# Patient Record
Sex: MALE | Race: WHITE
[De-identification: names, ages, dates, MRNs, and addresses within clinical notes are randomized per-mention and may not be internally consistent; named-entity substitution may affect disease eponyms.]

---

## 2024-04-08 ENCOUNTER — TRANSCRIBE ORDERS (OUTPATIENT)
Dept: OTHER | Age: 24
End: 2024-04-08

## 2024-04-08 DIAGNOSIS — R07.9 EXERTIONAL CHEST PAIN: ICD-10-CM

## 2024-04-08 DIAGNOSIS — R00.2 PALPITATIONS: Primary | ICD-10-CM

## 2024-04-08 DIAGNOSIS — Z86.79 HISTORY OF CARDIAC ARRHYTHMIA: ICD-10-CM

## 2024-10-21 ENCOUNTER — TRANSCRIBE ORDERS (OUTPATIENT)
Dept: OTHER | Age: 24
End: 2024-10-21

## 2024-10-21 DIAGNOSIS — F90.2 ATTENTION-DEFICIT/HYPERACTIVITY DISORDER, COMBINED PRESENTATION: Primary | ICD-10-CM

## 2024-10-21 DIAGNOSIS — G47.20 SLEEP-WAKE DISORDER: ICD-10-CM

## 2024-10-21 DIAGNOSIS — F41.1 GENERALIZED ANXIETY DISORDER: ICD-10-CM

## 2024-10-21 DIAGNOSIS — F33.0 MAJOR DEPRESSIVE DISORDER, RECURRENT EPISODE, MILD (H): ICD-10-CM

## 2024-10-21 DIAGNOSIS — G47.00 INSOMNIA, UNSPECIFIED TYPE: ICD-10-CM

## 2025-05-07 ENCOUNTER — OFFICE VISIT (OUTPATIENT)
Dept: SLEEP MEDICINE | Facility: CLINIC | Age: 25
End: 2025-05-07
Payer: COMMERCIAL

## 2025-05-07 VITALS
HEART RATE: 115 BPM | SYSTOLIC BLOOD PRESSURE: 127 MMHG | WEIGHT: 137.5 LBS | DIASTOLIC BLOOD PRESSURE: 88 MMHG | BODY MASS INDEX: 20.84 KG/M2 | HEIGHT: 68 IN | OXYGEN SATURATION: 99 %

## 2025-05-07 DIAGNOSIS — R06.83 SNORING: ICD-10-CM

## 2025-05-07 DIAGNOSIS — G47.19 EXCESSIVE DAYTIME SLEEPINESS: ICD-10-CM

## 2025-05-07 DIAGNOSIS — G47.21 CIRCADIAN RHYTHM SLEEP DISORDER, DELAYED SLEEP PHASE TYPE: Primary | ICD-10-CM

## 2025-05-07 PROCEDURE — 99205 OFFICE O/P NEW HI 60 MIN: CPT | Performed by: INTERNAL MEDICINE

## 2025-05-07 PROCEDURE — 99417 PROLNG OP E/M EACH 15 MIN: CPT | Performed by: INTERNAL MEDICINE

## 2025-05-07 PROCEDURE — 3079F DIAST BP 80-89 MM HG: CPT | Performed by: INTERNAL MEDICINE

## 2025-05-07 PROCEDURE — 3074F SYST BP LT 130 MM HG: CPT | Performed by: INTERNAL MEDICINE

## 2025-05-07 PROCEDURE — 1126F AMNT PAIN NOTED NONE PRSNT: CPT | Performed by: INTERNAL MEDICINE

## 2025-05-07 RX ORDER — GABAPENTIN 100 MG/1
100 CAPSULE ORAL 3 TIMES DAILY
COMMUNITY

## 2025-05-07 RX ORDER — TRAZODONE HYDROCHLORIDE 50 MG/1
50 TABLET ORAL AT BEDTIME
COMMUNITY

## 2025-05-07 ASSESSMENT — PATIENT HEALTH QUESTIONNAIRE - PHQ9: SUM OF ALL RESPONSES TO PHQ QUESTIONS 1-9: 12

## 2025-05-07 NOTE — PATIENT INSTRUCTIONS
"DELAYED SLEEP PHASE  What is it?   Delayed sleep phase disorder (DSP) is a circadian rhythm disorder. It consists of a typical sleep pattern that is \"delayed\" by two or more hours. This delay occurs when one s internal sleep clock (circadian rhythm) is shifted later at night and later in the morning. Once sleep occurs, the sleep is generally normal. But the delay leads to a pattern of sleep that is later than what is desired or what is considered socially acceptable. This pattern can be a problem when it interferes with work or social demands.  A person with DSP is likely to prefer late bedtimes and late wake-up times. When left to his or her own schedule, a person with DSP is likely to have a normal amount and quality of sleep. It simply occurs at a delayed time. One sign of this disorder is difficulty falling asleep until late at night. Another sign is having a hard time getting out of bed in the morning for work or school. These signs can make DSP look like insomnia. Daytime functioning can be severely impaired by DSP. It can lead to excessive sleepiness and fatigue. When able to sleep on their own schedules, people with DSP often stay up until they get tired and then sleep until they awaken late in the morning. In this case, they tend to have no complaint of difficulty falling to sleep or feeling poorly during the day.         Who gets it?   The exact rate of DSP is unknown in the general population. It is much more common in teens and young adults. From 7% to 16% of them may have it. DSP is likely to be found in 10% of people with a complaint of chronic insomnia. People who tend to be \"evening types\" or \"night owls\" are likely to develop DSP.     There is likely to be some genetic component. Some environmental factors may also be involved. A lack of exposure to morning sunlight may make it worse. Too much exposure to bright evening sunlight may also increase symptoms of DSP. A family history of DSP is common in " about 40% of people with the disorder.     How do I know if I have it?   Is there a delay in your sleep pattern in relation to your desired sleep and wake times? Do you have trouble falling asleep at the desired time of night? Are you then unable to awaken at the desired or socially acceptable time?   When left to your own sleep schedule, do you have a normal duration and quality of sleep? Does this sleep occur in a stable, but delayed time period in relation to what is desired or socially acceptable?   Have you had this kind of stable but delayed sleep time for at least seven days?   If you answered  yes  to these questions, then you may have delayed sleep phase disorder.  It is also important to know if there is something else that is causing your sleep problems. They may be a result of one of the following:  Another sleep disorder   A medical condition   Medication use   A mental health disorder   Substance abuse            Do I need to see a sleep specialist?       LIGHT THERAPY  What is it?  Light therapy is a treatment used for people who suffer from circadian rhythm sleep disorders. Your body has an internal clock that tells it when it is time to be asleep and when it is time to be awake.     This clock is located in the brain just above an area where the nerves travel to the eyes. This area is called the SCN. Your clock controls the  circadian rhythms  in your body. These rhythms include body temperature, alertness and the daily cycle of many hormones.     The word  circadian  means to occur in a cycle of about 24 hours. Circadian rhythms make you feel sleepy or alert at regular times every day. Some people have a circadian rhythm sleep disorder. This causes their natural sleep time to overlap with regular awake activities such as work or school.   Among other factors, your clock is  set  by your exposure to bright light such as sunlight. Exposure to bright light or  light therapy  is one method used to  treat people with a circadian rhythm sleep disorder.     The goal for treating patients who have circadian rhythm problems is to combine a healthy sleep pattern with an internal clock that is set at the right time. This will allow them to enjoy the benefits of good sleep.     Light therapy can help someone  re-set  a clock that is off. Regular sleep patterns help to keep the clock set at the new time. Light therapy is only part of a treatment plan that should be guided by a doctor who is familiar with sleep disorders.   Light therapy is used to expose your eyes to intense but safe amounts of light for a specific and regular length of time. In many places, sunlight is not available at the proper time to be used as treatment.     Artificial light may be used to affect the body clock in the same way that sunlight does. New advances continue to be made in this field. Currently, products that are used for light therapy fit into four basic groups:   1. Light Box   This is the most common tool that is used in light therapy. The box houses several tubes that produce extremely bright light. It sits on top of a table or desk and plugs into the wall.     During a treatment session, you have to keep within a certain distance of the box. Usually, you will be about 18 to 24 inches away from it. It does not require you to look directly into the light. Instead, you simply face in the direction of the box.     You are able to do other activities during the session. Ideally, you would work on papers or read something that is in the area being lit up. This will allow the light to be received by your eyes. Your body takes in this information and uses it to regulate the rhythms that control when you sleep and when you wake.   Earlier models of light boxes put out 2,500 to 5,000 lux of light. Lux is a measure of how much light falls on your eyes. These sessions could take two or three hours. Now, many boxes produce 10,000 lux of light.  This allows sessions to take as little as 15 to 30 minutes.     More than one session may be needed each day. It depends upon your body, your need, and the strength of light being used. The key is to use the light at the right time of day and for the right amount of time. This is based upon the sleep disorder you want to correct.     New models are also safer, protecting you from harmful UV rays. Some models are now focusing on a specific bandwidth of light. Light boxes can be purchased in a variety of makes and models. Some are now being made much smaller so they are easier to take with you. General prices range from $200 - $500 per light box.   2. Desk Lamp   This serves the same purpose as a light box, but it is made to look like a normal lamp. It blends in better when used in an office setting.   3. Light Visor   This is a light source that is worn on your head and hangs over your eyes. It looks much like a tennis visor. It is made so that you can move around during sessions. The strength of visor lights also varies from 3,000 to 10,000 lux.   4. Irma Simulator     These lights gradually make a dark room brighter over a set period of time. This is meant to mimic the sunrise. Some people may find that this helps them wake up in the morning. Models may also slowly dim to copy a sunset.          Who gets it?  Bright light therapy is used for people who suffer from circadian rhythm disorders. The time of day when the light is used will depend upon the disorder it is meant to correct. These disorders include the followin. Delayed sleep phase disorder   This causes people to fall asleep much later at night than is normal. As a result, they also wake up later in the morning. This sleep pattern can interfere with their schedule of activities for the day. To correct delayed sleep phase, light treatment takes place during the early morning hours.   2. Advanced sleep phase disorder   This causes people to fall  asleep much earlier at night than is normal. They also wake up earlier in the morning. To correct it, light treatment takes place early at night.   3. Free-running or Non-24-hour sleep-wake rhythm   People with this disorder fall asleep at a different time each day. For example, you may fall asleep at 10 p.m. one day, Midnight the next day, 2 a.m. the next, etc. This most often occurs in people who are blind. Light therapy may help blind people, even if they can't perceive visible light. Studies show that light treatment may be useful in the early morning hours.   4. Jet lag   Jet lag causes people to have problems with sleep when they have crossed many time zones on a flight. Light therapy in the morning may help when traveling east. For travel to the west, bright light in the evening may help reduce jet lag.   5. Shift Work   This sleep disorder occurs due to a work schedule, such as night shift, that takes place during the time when most people are sleeping. This schedule requires you to work when your body wants to sleep. Then you have to try to sleep when your body expects to be awake. Correcting it can be a hard problem to solve. Changing work schedules, days off, and social activities can alter your exposure to light from day to day. Frequent changes in your sleep times make it hard to re-set your internal clock. In general, using light treatment in the evening should help someone who regularly works nights. In this case, you would also want to avoid daylight when you come off work and go to bed. Dark sunglasses or special goggles can help.   6. Seasonal affective disorder (SAD)   SAD is a mood disorder that can cause people to feel sad and lack energy during the dark months of winter. A similar and milder version is often called the  Winter Blues.  In severe forms, sadness may be caused by depression. Light therapy is thought to be useful as one of the treatments for seasonal mood disorders and depression.  Depression is also treated with medications. You should consult your doctor if you are having serious problems with sadness.         Possible side effects?  Light therapy has a good record of safety. It does not seem to produce any major side effects. Light therapy should always be used within the proper limits for intensity and time. Minor side effects may include the following:   Eye irritation and dryness   Headache   Nausea   Dryness of skin   To reduce these side effects, begin the light therapy very slowly. Give your body time to get used to it. The use of a humidifier can also help with irritations caused by dryness. Talk to your doctor or a sleep specialist before beginning use.       Yes. It is easy to confuse DSP with normal variations of sleep and other types of insomnia. Consulting with a sleep specialist is your best bet to help clarify current sleep problems. He or she will also be able to help you develop a plan to correct these problems. A specialist can assess the factors that cause and make this problem worse. These include both social and behavioral factors.     What will the doctor need to know?   The sleep doctor will do a thorough physical exam. He or she will also discuss with you the history of these sleep problems. It would be helpful to keep a sleep diary prior to seeing a sleep doctor. Bring this information with you to the appointment. A sleep diary is a systematic way to track your sleep pattern. You record the time you get into bed, the time required to fall asleep, and the time you wake up in the morning. Sleep diaries often show a regular pattern of difficulty falling to sleep. They often show few or no awakenings once asleep. They also tend to show a sleep duration that is reduced during the work week and lengthy on the weekend.     Will I need to take any tests?   An overnight sleep study is not normally needed for someone who suffers from DSP. Your doctor may have you do an  overnight sleep study if your problem is severely disturbing your sleep. This study is called a polysomnogram. It charts your brain waves, heart rate, and breathing as you sleep. It also records how your arms and legs move. This study will help determine if there are any objective sleep disorders related to your sleep problem.     How is it treated?   The most accepted treatment for DSP is what is called chronotherapy. This is a method of behavioral treatment. Your bedtime is delayed by about three hours per day for five or six consecutive days. Once the desired bedtime is reached the schedule is frozen. This schedule needs to be maintained rigidly at this point.  Bright light therapy is another proven technique for changing one s internal circadian rhythms. But its specific use for DSP has not been well validated. In theory, exposure to bright light should occur shortly after waking up at the desired time in the morning. Then bright outdoor light in the evening hours should be avoided.              CBT-I Introductory Module    Understanding Sleep and Insomnia    Most people have trouble sleeping at some point in their life.   Chronic insomnia means you have had trouble falling asleep and/or staying asleep for at least the past three months.  Despite allowing enough time for sleep, it is affecting how you feel. You are not alone.  It is estimated that 10-15% of adults experience chronic insomnia.     Cognitive Behavioral Therapy for Insomnia (CBT-I)    Consistent with the guidelines of the American College [employer] Physicians, [facility] recommends  Cognitive Behavioral Therapy for Insomnia (CBT-I) as the first-line treatment for insomnia.  CBT-I targets factors that lead to long-term insomnia:    Behavioral Conditioning    When you lay awake in bed over many nights, your body actually becomes trained or 'conditioned' to be awake during the night.  It makes the bed associated with alertness instead of  sleepiness.    Habits that weaken your body's sleep drive and circadian sleep rhythm    Changing sleep schedules, extended time in bed, using mobile devices and computers close to bedtime, and naps too late in the day can harm your sleep at night.    Emotional and Physical Arousal    Things such as worrying about sleep, stress, drinking too much caffeine, and not winding down before bed can interfere with your body's natural sleep drive.    Understanding Sleep and Insomnia    [facility] CBT-I is a four-part program that teaches you the skills needed for a better night's sleep. The first step in your program is learning a bit about sleep and insomnia.      The Basics of Sleep    How does sleep help us?    Sleep, like food and water, is something we need every day. The purpose of sleep is still not exactly clear, but sleep experts agree we need consistent quality sleep to function at our best. There is evidence that sleep helps maintain brain and body functions.  It helps maintain thinking ability and mood.  Sleep is actually a very active part of life. Sleep occurs in four stages that cycle every  minutes throughout the night. We get our deepest sleep during the first few hours of sleep.  During the last half of our sleep, we usually get the bulk of our REM (Rapid Eye Movement) sleep.  REM sleep is when most of our dreaming occurs.    How much sleep do we need?    Sleep needs vary from person to person. Most adults need between 6-8 hours of sleep.  Sleeping too little or too much may be a health risk.  As we age, most people report their sleep gets lighter, earlier, shorter, and more restless.     What Controls Our Sleep?    The three things that regulate your sleep are your sleep drive, biological clock and your arousal system (emotional and physical).  Together these make us feel alert during the day and promote sleep at night.    Your sleep drive depends on how long you have been awake. It is lowest  when you first wake up.  Sleep drive gradually increases as the day goes on.  The longer time you are awake the easier it is to fall asleep.  Sleeping gradually reduces your sleep drive. That is why napping in the evening or close to bed can make it harder to sleep at night.    Your biological clock promotes wakefulness during the day and sleep at night.        Understanding Insomnia    What causes insomnia?    Some people have a greater predisposition to developing insomnia due to genetics, personality or age. A host of things can trigger or precipitate it: jet lag, working a different shift, medication, or the onset of a medical or mental health condition.             Insomnia and Your Arousal System    Mental activity, emotions and physical symptoms can make your brain too active to sleep by masking the strength of your sleep drive. Your brain's arousal system not only triggers insomnia, it plays a role in maintaining it as well.  Common sources of arousal include:    Worry about sleep in bed  An active mind concerned about unfinished tasks  Anxiety, Stress and Depression  Pain     What perpetuates insomnia?    Short-term insomnia can become chronic when you begin to feel fearful, worried and  on guard  about sleep loss. As you spend more time in bed or try forcing yourself to sleep your bed becomes linked with wakefulness.  This is why people with insomnia commonly report feeling tired before bed but suddenly more alert when getting into bed.  This type of  conditioning  along with unhelpful sleep habits maintains the insomnia even when the triggering event has resolved.    Tracking your Sleep    Sleep tracking is an essential part of training yourself to sleep better and monitoring your progress.  There are several ways to keep track of your sleep habits.     Insomnia  Irasema    The Insomnia  irasema is a convenient way to keep track of your sleep prior to and during treatment.  Simply download the free iraesma  on your Apple or Android phone and record your information each morning.   The data you enter should be your recollection of the past nigh of sleep. Do not watch or monitor the clock in the middle of the night while keeping your sleep diary.     The lucy also includes training and sleep schedule recommendations.  We recommend you use only the tracking function unless instructed by your provider. You can email your data to yourself prior to your visit by using the [employer] User Data function found in the Settings Section.  It is important that you have your data available to review with your provider at the time of your visit.             [employer] Sleep Diary    You can also track your sleep using the [employer] paper sleep diary.  You can upload your sleep diary and send it via a [employer] message, fax it to [phone], or have it with you at the time of your visit.          Mobile and Wearable Sleep Tracking Devices    There are many sleep tracking devices and mobile applications that estimate the timing and quantity of sleep by measuring body movement.  Though many of these devices claim to measure the depth or stages of sleep, they cannot measure brain wave activity or other indicators required to determine the actual stages of sleep.  They are helpful in estimating the timing and total amount of time you sleep.    CBT-I and Sleeping Pills    Sleep medications can be helpful in the short-term but often stop working in the long-term.  Sleeping pills treat symptoms and not the underlying cause of insomnia.  They also can have side effects that last well into the day. Abruptly stopping sleeping pills can cause temporary rebound insomnia and lead to increased distress about sleeplessness.  This in turn strengthens the belief that pills are necessary for sleep.    Many patients choose to discontinue sleeping pills prior to beginning CBT-I.  You should talk to your prescribing provider before tapering or  discontinuing sleep medication.

## 2025-05-07 NOTE — PROGRESS NOTES
[facility] [address] [address]  Phone: [phone]    Patient:  Antione Jimenez, Date of birth 2000  Date of Visit:  05/07/2025  Referring Provider Ysabel Davis              Reason for Sleep Consult:     Antione Jimenez is a 24 year old male for complaints of difficulty falling asleep and difficulty waking up in the morning since his early teenage.          Assessment and Plan:   24 years old male with history of generalized anxiety disorder, major depression since 2021 and a recent diagnosis of attention deficit hyperactivity disorder, currently taking dextroamphetamine 50 mg twice daily is here with complaint of difficulty falling asleep, nonrestorative sleep and difficulty waking up in the morning.  Recently has also been told of his intermittent snoring by his girlfriend.      Summary Sleep Diagnoses:    Circadian rhythm-delayed sleep phase type disorder. Antione's history and clinical presentation is consistent with circadian rhythm misalignment with significant face delay and associated disorder.  This is the likely explanation for his difficulty initiating sleep up until 3:30 AM and his tendency to oversleep his alarm and having significant significant morning sleep inertia.  His intrinsic circadian rhythm as suggested by his longstanding history, appears to be delayed by 5 hours with his sleep time around 3:30 AM to 4 AM  and his natural wake time around 1 PM.  His natural sleep requirement appears to be around 9 hours with his mid sleep point around 9 AM and likely core body temperature deepti around 11 AM.  Light exposure prior to this time will cause further sleep phase delay and should be avoided.  This was discussed in detail with the Antione.  He expressed interest in some phase advancement of his sleep but is concerned regarding his poor motivation and ability to maintain a strict sleep wake cycle.  [place] which usually exacerbates his symptoms and it can take up to a week or longer for him to readjust.  He is interested in circadian rhythm phase advancement on his return back to the country after the first week of July.    Insomnia.  Mostly reported as sleep initiation time is likely triggered by underlying circadian rhythm misalignment.  Recently has been prescribed trazodone along with 1 mg of melatonin which she has been taking sparingly as it usually does not help him fall asleep but makes it more difficult to wake up in the morning.  Increase sleep requirement.  Is not pulling up much subjective daytime sleepiness which could be masked by his use of dextroamphetamine.  He is currently taking 50 mg of immediate release dose twice daily.  He does report of having significant sleep inertia and frequently oversleeping multiple alarms.  Snoring. Antione's history and clinical presentation puts him at a low risk for sleep disordered breathing but in the context of excessive daytime sleepiness and presence of snoring a type III home sleep apnea test will be helpful in screening for possible sleep disordered breathing.        Comorbid Diagnoses:    Attention deficit hyperactivity disorder.  Anxiety disorder.  Major depression.    Summary Recommendations:    Actigraphy over 2 weeks to objectively assess sleep-wake cycle and quantify sleep duration.  NOx T3 Home sleep apnea test to screen for possible sleep disordered breathing.  Once he has returned back to the country in July, 2025 sleep phase advancement is directed as follows:    Take 0.5 mg at 10 PM for circadian rhythm anchoring and phase advancement.    Weeks 1-2: Sleep at 3:30 am, Wake at 11:30 AM, ambient sunlight exposure x 30 minutes at 12 PM    Weeks 3-4: Sleep at 2:30 AM, Wake at 10:30 AM, Light exposure x 30 minutes at 11 AM    Week s 5-onwards: Sleep at 1:30 AM, Wake at 9:30 AM, Light exposure x 30 minutes at 10:30 AM  Referral to  behavioral sleep specialist to comanage sleep phase advancement and cognitive behavioral therapy for insomnia.    Summary Counseling:  See instructions.  Patient has been provided with educational material regarding circadian rhythm-delayed sleep phase type disorder and cognitive behavioral therapy for insomnia.    Counseling included a comprehensive review of diagnostic and therapeutic strategies as well as risks of inadequate therapy.  Educational materials provided in instructions.             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  Originally from [place], Antione has been a night owl since early teenage.  He would go to bed between 3 AM to 4 AM and have given the opportunity would sleep until 1 PM.  During school years he would struggle with keeping himself awake earlier in the day.  Over time he was diagnosed with major depression, generalized anxiety and recently with attention deficit hyperactivity disorder.  He has some flexibility in his work and class schedule but this is usually inconsistent.  despite attempts, he would find it difficult to fall asleep before 3:30 AM.  He was prescribed 50 mg of trazodone along with 1 mg of melatonin which was not helpful.  He has been taking this sparingly as it does make him more groggy in the morning and make it more difficult for him to wake up.  He has been taking dextroamphetamine immediate release 50 mg dose twice a day which still keeps him awake does not resolve or improve his daytime fatigue and poor attention span.  Over the last few years he has had episodes of hypnopompic and hypnopompic hallucinations.  He reports of frequently dozing off during his classes unintentionally.  Denies having any episodes of sleep paralysis in recent past and denies ever having symptoms  suggestive of cataplexy.  He has been told that he snores recently by his girlfriend but no one has ever witnessed an apneic episode.  He denies having any gasp arousals.  He reports of having opportunity of sleeping up to 9 hours consistently which does not help and making him feel rested.  When asked in more detail, he reports that 9 to 10 hours of sleeping opportunities usually follows after significant sleep deprivation when he can crash and fall asleep earlier in the night.  He would like to advance his sleep for his at least up to midnight with his wake time of around 9 AM to 9:30 AM.      SLEEP-WAKE SCHEDULE:     Antione Jimenez     -Describes themself as a night person;  prefer to go to sleep at 4:00 AM and wake up at 1:00 PM.     -Naps 1-2 times per month for  minutes, feels refreshed after naps; takes some inadvertant naps.     Antione Jimenez    -ON WEEKDAYS, goes to sleep at 2:30 AM but will fall asleep around 3:30 AM during the week; usually awakens at 11:00 AM after oversleeping his set alarm at 10:30 AM. Occasionally will sleep until 1:00 PM; has difficulty falling asleep.     -ON WEEKENDS, goes to sleep at 3:30 AM.  usually awakens at 11:00 AM after oversleeping his set alarm at 10:30 AM. Occasionally will sleep until 1:00 PM; has difficulty falling asleep.     -Awakens 1-2 times a night for 5 minutes before falling back to sleep; awakens to go to the bathroom and uncertain reasons with social jet lag of 1-2 hours.        He does not feel rested in the morning.  8    SCALES       SLEEP APNEA: Stopbang score: 3         INSOMNIA:  Insomnia severity score: 18       SLEEPINESS: Colver sleepiness scale: 10 [normal < 11]   Drowsy driving/near accidents: He does not drive.  Consequences: Not applicable.    SLEEP COMPLAINTS:  Cardio-respiratory    Snoring-intermittent snoring as reported by girlfriend/week  Dyspnea-denies  Morning headaches or confusion-denies  Coexisting Lung disease:  "Denies    Coexisting Heart disease: Denies    Does patient have a bed partner: Currently sleeps alone.  Has bed partner been sleeping separately because of snoring: Denies            RLS Screen: When you try to relax in the evening or sleep at  night, do you ever have unpleasant, restless feelings in your  legs that can be relieved by walking or movement?  Denies    Periodic limb movement: Denies    Narcolepsy: Denies sudden urges of sleep attacks  Cataplexy: Denies   Sleep paralysis: Sporadic episodes of sleep paralysis and distant past.  Hallucinations:  He does report a \"hypnagogic and hypnopompic hallucinations.    Sleep Behaviors:  Leg symptoms/movements: Denies  Motor restlessness: Denies  Night terrors: Denies  Bruxism: Denies  Automatic behaviors: None    Other subjective complaints:  Anxiety or rumination: Occasionally.  Pain and discomfort at  night: Denies  Waking up with heart pounding or racing: Denies  GERD or aspiration: Denies         Parasomnia:   NREM: Denies recurrent persistent confusional arousal, night eating, sleep walking or sleep terrors   REM: Denies dream enactment; injuries or does report of sleep paralysis episodes in past.         Medications:     Current Outpatient Medications   Medication Sig Dispense Refill    gabapentin (NEURONTIN) 100 MG capsule Take 100 mg by mouth 3 times daily.      traZODone (DESYREL) 50 MG tablet Take 50 mg by mouth at bedtime.       No current facility-administered medications for this visit.        No Known Allergies         Past Medical History:     Does not need 02 supplement at night   No past medical history on file.          Past Surgical History:    Previous upper airway surgery: None.  No past surgical history on file.         Social History:     Social History     Tobacco Use    Smoking status: Never    Smokeless tobacco: Never   Substance Use Topics    Alcohol use: Not on file         Chemical History:     Tobacco: Denies.     Uses 3 cups/day of " tea. Last caffeine intake is usually before 11 PM    Supplements for wakefulness: Dex 50 mg BID    EtOH: 1-2 drinks per month   Recreational Drugs: Occasional Marijuana     Psych Hx:   Major Depression               Generalized anxiety.              Attention deficit hyperactivity disorder on dextroamphetamine.  Current dangers to self or others:none           Family History:     No family history on file.     Sleep Family Hx:        RLS-unaware   BING -denies  Insomnia -unaware  Parasomnia -unaware         Review of Systems:     A complete 10 point review of systems was negative other than HPI or as commented below:   Antione Jimenez has gained 0-5 pounds in the last year.      CONSTITUTIONAL: NEGATIVE for weight gain/loss, fever, chills, sweats or night sweats, drug allergies.  EYES: NEGATIVE for changes in vision, blind spots, double vision.  ENT: NEGATIVE for ear pain, sore throat, sinus pain, post-nasal drip, runny nose, bloody nose  CARDIAC: NEGATIVE for fast heartbeats or fluttering in chest, chest pain or pressure, breathlessness when lying flat, swollen legs or swollen feet.  NEUROLOGIC: NEGATIVE headaches, weakness or numbness in the arms or legs.  DERMATOLOGIC: NEGATIVE for rashes, new moles or change in mole(s)  PULMONARY: NEGATIVE SOB at rest, SOB with activity, dry cough, productive cough, coughing up blood, wheezing or whistling when breathing.    GASTROINTESTINAL: NEGATIVE for nausea or vomitting, loose or watery stools, fat or grease in stools, constipation, abdominal pain, bowel movements black in color or blood noted.  GENITOURINARY: NEGATIVE for pain during urination, blood in urine, urinating more frequently than usual, irregular menstrual periods.  MUSCULOSKELETAL: NEGATIVE for muscle pain, bone or joint pain, swollen joints.  ENDOCRINE: NEGATIVE for increased thirst or urination, diabetes.  LYMPHATIC: NEGATIVE for swollen lymph nodes, lumps or bumps in the breasts or nipple discharge.          Physical Examination:     Wt 62.4 kg (137 lb 8 oz)   GENERAL: alert and no distress  HEENT: Eyes grossly normal to inspection.  No discharge or erythema, or obvious scleral/conjunctival abnormalities.  RESP: No audible wheeze, cough, or visible cyanosis.    SKIN: Visible skin clear. No significant rash, abnormal pigmentation or lesions.  NEURO: Cranial nerves grossly intact.  Mentation and speech appropriate for age.  PSYCH: Appropriate affect, tone, and pace of words             Data: All pertinent previous laboratory data reviewed       Copy to: No Ref-Primary, Physician      Chetna Molina MD 5/7/2025     Total of 90 minutes of time was spent with patient, this includes the interview and exam, and review of the chart/labs/imaging/sleep study/PAP therapy and pertinent clinical data on 05/07/2025. This also includes (greater than 50%) time spent counseling and coordinating care.

## 2025-05-08 ENCOUNTER — TELEPHONE (OUTPATIENT)
Dept: SLEEP MEDICINE | Facility: CLINIC | Age: 25
End: 2025-05-08
Payer: COMMERCIAL

## 2025-05-08 NOTE — TELEPHONE ENCOUNTER
General Call      Reason for Call:  sleep study    What are your questions or concerns:  2 orders in not sure which one should schedule  Date of last appointment with provider: 5/7/2025    Okay to leave a detailed message?: Yes at Home number on file [phone] (home)

## 2025-05-08 NOTE — TELEPHONE ENCOUNTER
Chart reviewed. HST plus actigraphy. Message sent to sleep specialists for assistance with scheduling

## 2025-05-12 ENCOUNTER — TELEPHONE (OUTPATIENT)
Dept: SLEEP MEDICINE | Facility: CLINIC | Age: 25
End: 2025-05-12
Payer: COMMERCIAL

## 2025-08-15 ENCOUNTER — TELEPHONE (OUTPATIENT)
Dept: SLEEP MEDICINE | Facility: CLINIC | Age: 25
End: 2025-08-15
Payer: COMMERCIAL

## 2025-08-21 ENCOUNTER — TELEPHONE (OUTPATIENT)
Dept: SLEEP MEDICINE | Facility: CLINIC | Age: 25
End: 2025-08-21
Payer: COMMERCIAL